# Patient Record
Sex: FEMALE | URBAN - METROPOLITAN AREA
[De-identification: names, ages, dates, MRNs, and addresses within clinical notes are randomized per-mention and may not be internally consistent; named-entity substitution may affect disease eponyms.]

---

## 2020-10-07 ENCOUNTER — APPOINTMENT (RX ONLY)
Dept: URBAN - METROPOLITAN AREA CLINIC 92 | Facility: CLINIC | Age: 40
Setting detail: DERMATOLOGY
End: 2020-10-07

## 2020-10-07 DIAGNOSIS — K64.4 RESIDUAL HEMORRHOIDAL SKIN TAGS: ICD-10-CM

## 2020-10-07 PROBLEM — D48.5 NEOPLASM OF UNCERTAIN BEHAVIOR OF SKIN: Status: ACTIVE | Noted: 2020-10-07

## 2020-10-07 PROCEDURE — 99202 OFFICE O/P NEW SF 15 MIN: CPT

## 2020-10-07 PROCEDURE — ? COUNSELING

## 2020-10-07 PROCEDURE — ? TREATMENT REGIMEN

## 2020-10-07 ASSESSMENT — LOCATION SIMPLE DESCRIPTION DERM: LOCATION SIMPLE: PERIANAL SKIN

## 2020-10-07 ASSESSMENT — LOCATION ZONE DERM: LOCATION ZONE: ANUS

## 2020-10-07 ASSESSMENT — LOCATION DETAILED DESCRIPTION DERM: LOCATION DETAILED: PERIANAL SKIN

## 2020-10-07 NOTE — PROCEDURE: TREATMENT REGIMEN
Plan: Patient reports no joint pain, ulcers, uveitis located anywhere else on the body.\\nInflammatory panel checked by PCP, all within normal limits per pt\\nAlso evaluated by ophthalmologist to assess for signs/symptoms of uveitis - all negative per pt \\n
Otc Regimen: Preparation H cream for hemorrhoids
Continue Regimen: Discussed continue triamcinolone ointment prescribed by previous dermatologist prn
Detail Level: Detailed

## 2020-10-07 NOTE — HPI: ULCER
How Severe Is Your Sore?: mild
Is This A New Presentation, Or A Follow-Up?: Ulcer
Additional History: Patient reports that lesion began as a small skin tag like lesion, her ENT surgically removed lesion as possible hemorrhoids where lesion worsened. 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 Patient is present today for a second opinion as recommended by her primary care physician.
changed order to q8h over 4 hrs